# Patient Record
Sex: FEMALE | Race: WHITE | NOT HISPANIC OR LATINO | Employment: OTHER | URBAN - METROPOLITAN AREA
[De-identification: names, ages, dates, MRNs, and addresses within clinical notes are randomized per-mention and may not be internally consistent; named-entity substitution may affect disease eponyms.]

---

## 2017-07-07 ENCOUNTER — TELEPHONE (OUTPATIENT)
Dept: SURGERY | Facility: CLINIC | Age: 67
End: 2017-07-07

## 2017-07-11 ENCOUNTER — TELEPHONE (OUTPATIENT)
Dept: SURGERY | Facility: CLINIC | Age: 67
End: 2017-07-11

## 2017-07-11 NOTE — TELEPHONE ENCOUNTER
----- Message from Liz Glasgow sent at 7/11/2017 11:49 AM CDT -----  Contact: self  Pt states she would like to reschedule apt to 07/19/2017 if possible.Please call Cindy seymour @ [phone].Thank you.

## 2017-07-17 ENCOUNTER — INITIAL CONSULT (OUTPATIENT)
Dept: SURGERY | Facility: CLINIC | Age: 67
End: 2017-07-17
Payer: MEDICARE

## 2017-07-17 ENCOUNTER — TELEPHONE (OUTPATIENT)
Dept: GASTROENTEROLOGY | Facility: CLINIC | Age: 67
End: 2017-07-17

## 2017-07-17 VITALS
TEMPERATURE: 98 F | WEIGHT: 216.5 LBS | DIASTOLIC BLOOD PRESSURE: 83 MMHG | SYSTOLIC BLOOD PRESSURE: 136 MMHG | HEART RATE: 96 BPM

## 2017-07-17 DIAGNOSIS — R93.3 ABNORMAL CT SCAN, GASTROINTESTINAL TRACT: Primary | ICD-10-CM

## 2017-07-17 DIAGNOSIS — D13.6 CYSTADENOMA OF PANCREAS: ICD-10-CM

## 2017-07-17 DIAGNOSIS — K86.89 PANCREATIC MASS: Primary | ICD-10-CM

## 2017-07-17 PROCEDURE — 1126F AMNT PAIN NOTED NONE PRSNT: CPT | Mod: ,,, | Performed by: SURGERY

## 2017-07-17 PROCEDURE — 99999 PR PBB SHADOW E&M-EST. PATIENT-LVL III: CPT | Mod: PBBFAC,,, | Performed by: SURGERY

## 2017-07-17 PROCEDURE — 99213 OFFICE O/P EST LOW 20 MIN: CPT | Mod: PBBFAC | Performed by: SURGERY

## 2017-07-17 PROCEDURE — 99204 OFFICE O/P NEW MOD 45 MIN: CPT | Mod: S$PBB,,, | Performed by: SURGERY

## 2017-07-17 PROCEDURE — 1159F MED LIST DOCD IN RCRD: CPT | Mod: ,,, | Performed by: SURGERY

## 2017-07-17 RX ORDER — ZOLPIDEM TARTRATE 10 MG/1
10 TABLET ORAL NIGHTLY
COMMUNITY
Start: 2017-07-10

## 2017-07-17 RX ORDER — SIMVASTATIN 40 MG/1
40 TABLET, FILM COATED ORAL NIGHTLY
COMMUNITY

## 2017-07-17 RX ORDER — INSULIN ASPART 100 [IU]/ML
INJECTION, SOLUTION INTRAVENOUS; SUBCUTANEOUS
COMMUNITY

## 2017-07-17 RX ORDER — OMEPRAZOLE 20 MG/1
20 CAPSULE, DELAYED RELEASE ORAL
COMMUNITY
Start: 2017-02-16 | End: 2018-02-16

## 2017-07-17 RX ORDER — LISINOPRIL 10 MG/1
10 TABLET ORAL DAILY
COMMUNITY

## 2017-07-17 RX ORDER — ALBUTEROL SULFATE 0.83 MG/ML
2.5 SOLUTION RESPIRATORY (INHALATION) 2 TIMES DAILY PRN
COMMUNITY
Start: 2017-03-20

## 2017-07-17 NOTE — PROGRESS NOTES
"History & Physical    SUBJECTIVE:     History of Present Illness:  Patient is a 66 y.o. 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 She did not know it was present on a scan in 2013 but has had miniscule growth since then. She c/o LLQ pain which is why she got the scan this time, not associated with anything, no constipation/diarrhea, no weight loss. Has insulin pump for DM, not well controlled. She has diverticulosis on her cscope.     Reports hgb a1c 8 in May    Of note, her mother recently went to hospice and her  is undergoing treatment for facial melanoma    Chief Complaint   Patient presents with    Consult       Review of patient's allergies indicates:   Allergen Reactions    Meperidine Other (See Comments)     PT STATES, "I DON'T KNOW WHAT DEMEROL DOES TO ME; IT'S BEEN SO LONG AGO"       Current Outpatient Prescriptions   Medication Sig Dispense Refill    insulin aspart (NOVOLOG) 100 unit/mL injection Inject into the skin.      INSULIN PUMP CARTRIDGE SUBQ Inject into the skin.      lisinopril 10 MG tablet Take 10 mg by mouth.      omeprazole (PRILOSEC) 20 MG capsule Take 20 mg by mouth.      simvastatin (ZOCOR) 40 MG tablet Take 40 mg by mouth.      zolpidem (AMBIEN) 10 mg Tab Take 10 mg by mouth.      albuterol (PROVENTIL) 2.5 mg /3 mL (0.083 %) nebulizer solution Inhale 2.5 mg into the lungs.       No current facility-administered medications for this visit.        Past Medical History:   Diagnosis Date    Diabetes mellitus     Gallstones     HTN (hypertension)     Hyperlipidemia     Pancreas cyst      Past Surgical History:   Procedure Laterality Date    CHOLECYSTECTOMY  2017    TOTAL ABDOMINAL HYSTERECTOMY W/ BILATERAL SALPINGOOPHORECTOMY      Bladder sling/pelvic mesh     History reviewed. No pertinent family history.  Social History   Substance Use Topics    Smoking status: Never Smoker    Smokeless tobacco: Never Used    Alcohol use No        Review " of Systems:  Review of Systems   Constitutional: Negative for activity change, appetite change, chills and fever.   HENT: Negative for congestion and trouble swallowing.    Eyes: Negative for visual disturbance.   Respiratory: Negative for cough and shortness of breath.    Cardiovascular: Negative for chest pain and leg swelling.   Gastrointestinal: Negative for abdominal distention, abdominal pain, constipation, diarrhea, nausea and vomiting.   Endocrine: Negative for cold intolerance and heat intolerance.   Genitourinary: Negative for difficulty urinating and dysuria.   Musculoskeletal: Negative for arthralgias and back pain.   Skin: Negative for rash and wound.   Neurological: Negative for dizziness and headaches.   Psychiatric/Behavioral: Negative for agitation and behavioral problems.       OBJECTIVE:     Vital Signs (Most Recent)  Temp: 97.7 °F (36.5 °C) (07/17/17 1342)  Pulse: 96 (07/17/17 1342)  BP: 136/83 (07/17/17 1342)     98.2 kg (216 lb 7.9 oz)     Physical Exam:  Physical Exam   Constitutional: She is oriented to person, place, and time. She appears well-developed and well-nourished. No distress.   Cardiovascular: Normal rate and regular rhythm.  Exam reveals no gallop and no friction rub.    No murmur heard.  Pulmonary/Chest: Effort normal and breath sounds normal. No respiratory distress. She has no wheezes. She has no rales.   Abdominal: Soft. Bowel sounds are normal.   Musculoskeletal: Normal range of motion. She exhibits no edema.   Neurological: She is alert and oriented to person, place, and time.   Skin: Skin is warm and dry.   Psychiatric: She has a normal mood and affect. Her behavior is normal.       CT scan reviewed 16mm well circumscribed cystic lesion in the tail of the pancreas    ASSESSMENT/PLAN:     65yo woman with cystic pancreas tail mass    PLAN:Plan     EUS to further characterize  Will determine next time after but currently no overtly concerning findings    Ingrid Escalante  PGY4  [phone]    I have personally taken the history and examined this patient and agree with the resident's note as stated above.  CT scan personally reviewed.   I have recommended EUS to characterize the lesion more accurately with further recs to follow. She understands and agrees

## 2017-07-24 ENCOUNTER — TELEPHONE (OUTPATIENT)
Dept: SURGERY | Facility: CLINIC | Age: 67
End: 2017-07-24

## 2017-07-24 ENCOUNTER — TELEPHONE (OUTPATIENT)
Dept: GASTROENTEROLOGY | Facility: CLINIC | Age: 67
End: 2017-07-24

## 2017-07-24 ENCOUNTER — TELEPHONE (OUTPATIENT)
Dept: ENDOSCOPY | Facility: HOSPITAL | Age: 67
End: 2017-07-24

## 2017-07-24 NOTE — TELEPHONE ENCOUNTER
----- Message from Marlin Roldan sent at 7/24/2017 10:33 AM CDT -----  Contact: self   Cindy States that they need a call returned by a nurse in reference to scheduling her surgery asap since she is here today . Please call Cindy @ [phone].                                  Thanks :)

## 2017-07-24 NOTE — TELEPHONE ENCOUNTER
Spoke with patient.  YAYA scheduled 7/31/17 at 1300.  Afternoon time per patient's request.  Instructions emailed.  She will ask her doctor about adjusting insulin pump while fasting.

## 2017-07-31 ENCOUNTER — SURGERY (OUTPATIENT)
Age: 67
End: 2017-07-31

## 2017-07-31 ENCOUNTER — HOSPITAL ENCOUNTER (OUTPATIENT)
Facility: HOSPITAL | Age: 67
Discharge: HOME | End: 2017-07-31
Attending: INTERNAL MEDICINE | Admitting: INTERNAL MEDICINE
Payer: MEDICARE

## 2017-07-31 ENCOUNTER — ANESTHESIA (OUTPATIENT)
Dept: ENDOSCOPY | Facility: HOSPITAL | Age: 67
End: 2017-07-31
Payer: MEDICARE

## 2017-07-31 ENCOUNTER — ANESTHESIA EVENT (OUTPATIENT)
Dept: ENDOSCOPY | Facility: HOSPITAL | Age: 67
End: 2017-07-31
Payer: MEDICARE

## 2017-07-31 VITALS
DIASTOLIC BLOOD PRESSURE: 79 MMHG | OXYGEN SATURATION: 100 % | SYSTOLIC BLOOD PRESSURE: 143 MMHG | BODY MASS INDEX: 34.72 KG/M2 | HEIGHT: 66 IN | HEART RATE: 70 BPM | RESPIRATION RATE: 18 BRPM | WEIGHT: 216 LBS | TEMPERATURE: 98 F

## 2017-07-31 DIAGNOSIS — K86.2 PANCREAS CYST: Primary | ICD-10-CM

## 2017-07-31 LAB
POCT GLUCOSE: 137 MG/DL (ref 70–110)
POCT GLUCOSE: 185 MG/DL (ref 70–110)

## 2017-07-31 PROCEDURE — 43243 EGD INJECTION VARICES: CPT | Performed by: INTERNAL MEDICINE

## 2017-07-31 PROCEDURE — 88305 TISSUE EXAM BY PATHOLOGIST: CPT | Mod: 26,,, | Performed by: PATHOLOGY

## 2017-07-31 PROCEDURE — D9220A PRA ANESTHESIA: Mod: CRNA,,, | Performed by: NURSE ANESTHETIST, CERTIFIED REGISTERED

## 2017-07-31 PROCEDURE — 63600175 PHARM REV CODE 636 W HCPCS: Performed by: NURSE ANESTHETIST, CERTIFIED REGISTERED

## 2017-07-31 PROCEDURE — 82962 GLUCOSE BLOOD TEST: CPT | Performed by: INTERNAL MEDICINE

## 2017-07-31 PROCEDURE — 37000008 HC ANESTHESIA 1ST 15 MINUTES: Performed by: INTERNAL MEDICINE

## 2017-07-31 PROCEDURE — 88313 SPECIAL STAINS GROUP 2: CPT | Mod: 26,,, | Performed by: PATHOLOGY

## 2017-07-31 PROCEDURE — 88172 CYTP DX EVAL FNA 1ST EA SITE: CPT | Mod: 26,,, | Performed by: PATHOLOGY

## 2017-07-31 PROCEDURE — D9220A PRA ANESTHESIA: Mod: ANES,,, | Performed by: ANESTHESIOLOGY

## 2017-07-31 PROCEDURE — 43242 EGD US FINE NEEDLE BX/ASPIR: CPT | Mod: ,,, | Performed by: INTERNAL MEDICINE

## 2017-07-31 PROCEDURE — 88172 CYTP DX EVAL FNA 1ST EA SITE: CPT | Performed by: PATHOLOGY

## 2017-07-31 PROCEDURE — 43242 EGD US FINE NEEDLE BX/ASPIR: CPT | Performed by: INTERNAL MEDICINE

## 2017-07-31 PROCEDURE — 25000003 PHARM REV CODE 250: Performed by: INTERNAL MEDICINE

## 2017-07-31 PROCEDURE — 37000009 HC ANESTHESIA EA ADD 15 MINS: Performed by: INTERNAL MEDICINE

## 2017-07-31 PROCEDURE — 88305 TISSUE EXAM BY PATHOLOGIST: CPT | Performed by: PATHOLOGY

## 2017-07-31 PROCEDURE — 25000003 PHARM REV CODE 250: Performed by: NURSE ANESTHETIST, CERTIFIED REGISTERED

## 2017-07-31 PROCEDURE — 88173 CYTOPATH EVAL FNA REPORT: CPT | Mod: 26,,, | Performed by: PATHOLOGY

## 2017-07-31 RX ORDER — SODIUM CHLORIDE 9 MG/ML
INJECTION, SOLUTION INTRAVENOUS CONTINUOUS PRN
Status: DISCONTINUED | OUTPATIENT
Start: 2017-07-31 | End: 2017-07-31

## 2017-07-31 RX ORDER — GLYCOPYRROLATE 0.2 MG/ML
INJECTION INTRAMUSCULAR; INTRAVENOUS
Status: DISCONTINUED | OUTPATIENT
Start: 2017-07-31 | End: 2017-07-31

## 2017-07-31 RX ORDER — ONDANSETRON 2 MG/ML
INJECTION INTRAMUSCULAR; INTRAVENOUS
Status: DISCONTINUED | OUTPATIENT
Start: 2017-07-31 | End: 2017-07-31

## 2017-07-31 RX ORDER — LIDOCAINE HCL/PF 100 MG/5ML
SYRINGE (ML) INTRAVENOUS
Status: DISCONTINUED | OUTPATIENT
Start: 2017-07-31 | End: 2017-07-31

## 2017-07-31 RX ORDER — PROPOFOL 10 MG/ML
VIAL (ML) INTRAVENOUS CONTINUOUS PRN
Status: DISCONTINUED | OUTPATIENT
Start: 2017-07-31 | End: 2017-07-31

## 2017-07-31 RX ORDER — CIPROFLOXACIN 2 MG/ML
INJECTION, SOLUTION INTRAVENOUS
Status: DISCONTINUED | OUTPATIENT
Start: 2017-07-31 | End: 2017-07-31

## 2017-07-31 RX ORDER — SODIUM CHLORIDE 9 MG/ML
INJECTION, SOLUTION INTRAVENOUS CONTINUOUS
Status: DISCONTINUED | OUTPATIENT
Start: 2017-07-31 | End: 2017-07-31 | Stop reason: HOSPADM

## 2017-07-31 RX ORDER — CIPROFLOXACIN 500 MG/1
500 TABLET ORAL 2 TIMES DAILY
Qty: 6 TABLET | Refills: 0 | Status: SHIPPED | OUTPATIENT
Start: 2017-07-31 | End: 2017-08-03

## 2017-07-31 RX ADMIN — GLYCOPYRROLATE 0.2 MG: 0.2 INJECTION, SOLUTION INTRAMUSCULAR; INTRAVENOUS at 01:07

## 2017-07-31 RX ADMIN — LIDOCAINE HYDROCHLORIDE 100 MG: 20 INJECTION, SOLUTION INTRAVENOUS at 01:07

## 2017-07-31 RX ADMIN — CIPROFLOXACIN 400 MG: 2 INJECTION, SOLUTION INTRAVENOUS at 02:07

## 2017-07-31 RX ADMIN — PROPOFOL 150 MCG/KG/MIN: 10 INJECTION, EMULSION INTRAVENOUS at 01:07

## 2017-07-31 RX ADMIN — SODIUM CHLORIDE: 0.9 INJECTION, SOLUTION INTRAVENOUS at 01:07

## 2017-07-31 RX ADMIN — ONDANSETRON 4 MG: 2 INJECTION INTRAMUSCULAR; INTRAVENOUS at 02:07

## 2017-07-31 NOTE — PATIENT INSTRUCTIONS
Discharge Summary/Instructions for after an Upper EUS  Patient Name: Cindy Jane  Patient MRN: [id number]  Patient YOB: 1950 Monday, July 31, 2017  Abelino Mirza MD  1.  Do Not eat or drink anything for 1 hour.  Try sips of water first.  If   tolerated, resume your regular diet or one recommended by your physician.  2.  Do not drive, operate machinery, make critical decisions, or do   activities that require coordination or balance for 24 hours.  3.  You may experience a sore throat for 24 to 48 hours.  You may use throat   lozenges or gargle with warm salt water to relieve the discomfort.  4.  Because air was put into your stomach during the procedure, you may   experience some belching.  5.  Go directly to the emergency room if you notice any of the following:   Chills and/or fever over 101 F   Persistent vomiting or vomiting with blood   Severe abdominal pain, other than gas cramps   Severe chest pain   Black, tarry stools  Your doctor recommends these additional instructions:  You are being discharged to home.   Resume your previous diet.   Continue your present medications.   Return to your referring physician.   We are waiting for cytology results.  If you have any questions on the above instructions, call the GI Lab and ask   for an endoscopy nurse.  If you have any problems, please call your physician.  EMERGENCY PHONE NUMBER: [phone]  LAB RESULTS: [phone]  Abelino Mirza MD  7/31/2017 2:55:24 PM  This report has been verified and signed electronically.

## 2017-07-31 NOTE — TRANSFER OF CARE
"Anesthesia Transfer of Care Note    Patient: Cindy Jane    Procedure(s) Performed: Procedure(s) (LRB):  ULTRASOUND-ENDOSCOPIC-UPPER (N/A)    Patient location: PACU    Anesthesia Type: general    Transport from OR: Transported from OR on 2-3 L/min O2 by NC with adequate spontaneous ventilation    Post pain: adequate analgesia    Post assessment: no apparent anesthetic complications    Post vital signs: stable    Level of consciousness: awake    Nausea/Vomiting: no nausea/vomiting    Complications: none    Transfer of care protocol was followed      Last vitals:   Visit Vitals  BP (!) 141/72 (BP Location: Left arm, Patient Position: Lying, BP Method: Automatic)   Pulse 83   Temp 36.6 °C (97.9 °F) (Temporal)   Resp 18   Ht 5' 6" (1.676 m)   Wt 98 kg (216 lb)   SpO2 99%   Breastfeeding? No   BMI 34.86 kg/m²     "

## 2017-07-31 NOTE — ANESTHESIA PREPROCEDURE EVALUATION
07/31/2017  Cindy Jane is a 66 y.o., female.    Anesthesia Evaluation         Review of Systems  Anesthesia Hx:  No problems with previous Anesthesia   Social:  Non-Smoker, No Alcohol Use    Cardiovascular:   Hypertension, well controlled 2 METS   Pulmonary:  Pulmonary Normal Heavy snoar   Hepatic/GI:   Some nausea after large meals   Neurological:  Neurology Normal    Endocrine:   Diabetes, using insulin        Physical Exam  General:  Morbid Obesity    Airway/Jaw/Neck:  Airway Findings: Mouth Opening: Normal Tongue: Normal  General Airway Assessment: Adult  Mallampati: II  Improves to II with phonation.  TM Distance: Normal, at least 6 cm  Jaw/Neck Findings:  Neck ROM: Normal ROM      Dental:  Dental Findings: In tact   Chest/Lungs:  Chest/Lungs Findings: Clear to auscultation         Mental Status:  Mental Status Findings:  Cooperative         Anesthesia Plan  Type of Anesthesia, risks & benefits discussed:  Anesthesia Type:  general  Patient's Preference:   Intra-op Monitoring Plan: standard ASA monitors  Intra-op Monitoring Plan Comments:   Post Op Pain Control Plan: multimodal analgesia  Post Op Pain Control Plan Comments:   Induction:   IV  Beta Blocker:  Patient is not currently on a Beta-Blocker (No further documentation required).       Informed Consent: Patient understands risks and agrees with Anesthesia plan.  Questions answered. Anesthesia consent signed with patient.  ASA Score: 2     Day of Surgery Review of History & Physical:    H&P update referred to the surgeon.         Ready For Surgery From Anesthesia Perspective.

## 2017-07-31 NOTE — ANESTHESIA POSTPROCEDURE EVALUATION
"Anesthesia Post Evaluation    Patient: Cindy Jane    Procedure(s) Performed: Procedure(s) (LRB):  ULTRASOUND-ENDOSCOPIC-UPPER (N/A)    Final Anesthesia Type: general  Patient location during evaluation: PACU  Patient participation: Yes- Able to Participate  Level of consciousness: awake and alert  Post-procedure vital signs: reviewed and stable  Pain management: adequate  Airway patency: patent  PONV status at discharge: No PONV  Anesthetic complications: no      Cardiovascular status: blood pressure returned to baseline  Respiratory status: unassisted  Hydration status: euvolemic  Follow-up not needed.        Visit Vitals  BP (!) 148/70   Pulse 70   Temp 36.6 °C (97.9 °F) (Temporal)   Resp 18   Ht 5' 6" (1.676 m)   Wt 98 kg (216 lb)   SpO2 100%   Breastfeeding? No   BMI 34.86 kg/m²       Pain/Eugene Score: Pain Assessment Performed: Yes (7/31/2017  2:35 PM)  Presence of Pain: non-verbal indicators absent (7/31/2017  2:35 PM)  Eugene Score: 5 (7/31/2017  2:35 PM)      "

## 2017-07-31 NOTE — DISCHARGE INSTRUCTIONS
Endoscopic Ultrasound (EUS)    An endoscopic ultrasound (EUS) is a test to look at the inside of your gastrointestinal (GI) tract. It's commonly used to look for cancers or growths in the esophagus, stomach, pancreas, liver, and rectum. It can help to stage cancer (see how advanced a cancer is). EUS may also be used to help diagnose certain diseases or to drain cysts or abscesses.  What is EUS?  EUS shows both ultrasound images and live video of the GI tract. During the test, a flexible tube called an endoscope (scope) is used. At the end of the scope is a tiny video camera and light. The video camera sends live images to a monitor. The scope also contains a very small ultrasound device. This uses sound waves to create images and send them to a monitor.  A needle is passed through the scope. The needle can be used take a small sample of tissue for testing. This is called a biopsy. The needle can be used to take a sample of fluid. This is called fine-needle aspiration (FNA).  Risks and possible complications of EUS  Risks and possible complications include the following:  · Bleeding  · Infection  · A perforation (hole) in the digestive tract   · Risks of sedation or anesthesia   Before the test  Be prepared prior to the test:  · Tell your healthcare provider what medicine you take. This includes vitamins, herbs, and over-the-counter medicine. It also includes any blood thinners, such as warfarin, clopidogrel, ibuprofen, or daily aspirin. Ask your healthcare provider if you need to stop taking some or all of them before the test.  · You may be prescribed antibiotics to take before or after the test. This depends on the area being studied and what is done during the test. These medicines help prevent infection.  · Carefully follow the instructions for preparing for the test to make sure results are accurate. Instructions may include:  ¨ If youre having an EUS of the upper GI tract (esophagus, stomach, duodenum,  pancreas, liver):  § Do not eat or drink for 6 hours before the test.  ¨ If youre having an EUS of the lower GI tract (rectum):  § Before the test, do bowel prep as instructed to clean your rectum of stool. This may involve a clear liquid diet and using a laxative (liquid or pills) the night before the test. Or it may mean doing one or more enemas the morning of the test.  § Do not eat or drink for 6 hours before the test.  · Be sure to arrive on time at the facility. Bring your identification and health insurance card. Leave valuables at home. If you have them, bring X-rays or other test results with you.  Let the healthcare provider know  For your safety, tell the healthcare provider if you:  · Take insulin. Your dose may need to be changed on the day of your test.  · Are allergic to latex.  · Have any other allergies.  · Are taking blood thinners.   During the test  An endoscopic ultrasound usually takes place in a hospital. The procedure itself may take 1 to 2 hours. You will likely go home soon afterward. During the test:  · You lie on your left side on an exam table.  · An intravenous (IV) line will be put into a vein in your arm or hand. This line supplies fluids and medicines. To keep you comfortable during the test, you will be given a sedative medicine. This medicine prevents discomfort and will make you sleepy.  · If you are having an EUS of the upper GI tract, local anesthetic may be sprayed in your throat. This will help you be more comfortable as the healthcare provider inserts the scope. The healthcare provider then gently puts the flexible scope into your mouth or nose and down your throat.  · If youre having an EUS of the lower GI tract, the healthcare provider gently puts the flexible scope into your anus.  · During the test, the scope sends live video and ultrasound images from inside your body to nearby monitors. These are used to examine your GI tract. Specialized procedures, such as drainage,  are done as needed.  · The healthcare provider may discuss the results with you soon after the test. Biopsy results take several  days.  · In most cases, you can go home within a few hours of the test. When you leave the facility, have an adult family member or friend drive you, even if you don't feel that sleepy.  After the test  Here is what to expect after the test:  · You may feel tired from the sedative. This should wear off by the end of the day.  · If you had an upper digestive endoscopy, your throat may feel sore for a day or two. Over-the-counter sore throat lozenges and spray should help.  · You can eat and drink normally as soon as the test is done.  When to call the healthcare provider  Call your healthcare provider if you notice any of the following:  · Fever of 100.4°F (38.0°C) or higher, or as advised by your healthcare provider  · Shortness of breath  · Vomiting blood, blood in stool, or black stools  · Coughing or hoarse voice that wont go away   Date Last Reviewed: 7/1/2016  © 1486-4304 [employer]. [address] [place] [address]. All rights reserved. This information is not intended as a substitute for professional medical care. Always follow your healthcare professional's instructions.

## 2017-07-31 NOTE — ANESTHESIA RELEASE NOTE
"Anesthesia Release from PACU Note    Patient: Cindy Jane    Procedure(s) Performed: Procedure(s) (LRB):  ULTRASOUND-ENDOSCOPIC-UPPER (N/A)    Anesthesia type: general    Post pain: Adequate analgesia    Post assessment: no apparent anesthetic complications    Last Vitals:   Visit Vitals  BP (!) 148/70   Pulse 70   Temp 36.6 °C (97.9 °F) (Temporal)   Resp 18   Ht 5' 6" (1.676 m)   Wt 98 kg (216 lb)   SpO2 100%   Breastfeeding? No   BMI 34.86 kg/m²       Post vital signs: stable    Level of consciousness: awake, alert  and oriented    Nausea/Vomiting: no nausea/no vomiting    Complications: none    Airway Patency: patent    Respiratory: unassisted    Cardiovascular: stable and blood pressure at baseline    Hydration: euvolemic  "

## 2017-07-31 NOTE — H&P
"History & Physical - Short Stay  Gastroenterology      SUBJECTIVE:     Procedure: EUS    Chief Complaint/Indication for Procedure: pancreas cyst    History of Present Illness:  Patient is a 66 y.o. female with incidental pancreas cyst coming for EUS.     PTA Medications   Medication Sig    albuterol (PROVENTIL) 2.5 mg /3 mL (0.083 %) nebulizer solution Inhale 2.5 mg into the lungs.    insulin aspart (NOVOLOG) 100 unit/mL injection Inject into the skin.    INSULIN PUMP CARTRIDGE SUBQ Inject into the skin.    lisinopril 10 MG tablet Take 10 mg by mouth.    omeprazole (PRILOSEC) 20 MG capsule Take 20 mg by mouth.    simvastatin (ZOCOR) 40 MG tablet Take 40 mg by mouth.    zolpidem (AMBIEN) 10 mg Tab Take 10 mg by mouth.       Review of patient's allergies indicates:   Allergen Reactions    Meperidine Other (See Comments)     PT STATES, "I DON'T KNOW WHAT DEMEROL DOES TO ME; IT'S BEEN SO LONG AGO"        Past Medical History:   Diagnosis Date    Diabetes mellitus     Gallstones     HTN (hypertension)     Hyperlipidemia     Pancreas cyst      Past Surgical History:   Procedure Laterality Date    CHOLECYSTECTOMY  2017    TOTAL ABDOMINAL HYSTERECTOMY W/ BILATERAL SALPINGOOPHORECTOMY      Bladder sling/pelvic mesh     History reviewed. No pertinent family history.  Social History   Substance Use Topics    Smoking status: Never Smoker    Smokeless tobacco: Never Used    Alcohol use No       Review of Systems:  Constitutional: no fever or chills  Gastrointestinal: no nausea or vomiting, no abdominal pain or change in bowel habits    OBJECTIVE:     Vital Signs (Most Recent)       Physical Exam:  General: well developed, well nourished  Abdomen: soft, non-tender non-distented; bowel sounds normal; no masses,  no organomegaly         ASSESSMENT/PLAN:     Patient is a 66 y.o. female with incidental pancreas cyst coming for EUS.     Plan: EUS    Anesthesia Plan: MAC    ASA Grade: ASA 2 - Patient with mild " systemic disease with no functional limitations

## 2017-07-31 NOTE — PLAN OF CARE
Patient is ready for discharge and is waiting on Dr Fox to come to patients bedside. Endoscopy unit notified.

## 2017-07-31 NOTE — PLAN OF CARE
Plan of care discussed with pt and her . Understanding verbalized. Pt states can discuss results with her

## 2017-08-08 ENCOUNTER — TELEPHONE (OUTPATIENT)
Dept: SURGERY | Facility: CLINIC | Age: 67
End: 2017-08-08

## 2017-08-08 NOTE — TELEPHONE ENCOUNTER
----- Message from Marlin Roldan sent at 8/8/2017  8:33 AM CDT -----  Contact: self  Cindy Called and wanted to schedule a appointment with  for august 16th           And also had general questions. Please call Cindy @ [phone]  . Thanks :)

## 2017-08-09 ENCOUNTER — TELEPHONE (OUTPATIENT)
Dept: SURGERY | Facility: CLINIC | Age: 67
End: 2017-08-09

## 2017-08-09 ENCOUNTER — TELEPHONE (OUTPATIENT)
Dept: ENDOSCOPY | Facility: HOSPITAL | Age: 67
End: 2017-08-09

## 2017-08-09 NOTE — TELEPHONE ENCOUNTER
Patricia Bone, patient called asking about Bx results from case done 7/31/17.  She can be reached at [phone].

## 2017-08-11 ENCOUNTER — TELEPHONE (OUTPATIENT)
Dept: SURGERY | Facility: CLINIC | Age: 67
End: 2017-08-11

## 2018-07-03 ENCOUNTER — TELEPHONE (OUTPATIENT)
Dept: SURGERY | Facility: CLINIC | Age: 68
End: 2018-07-03

## 2018-07-06 ENCOUNTER — TELEPHONE (OUTPATIENT)
Dept: SURGERY | Facility: CLINIC | Age: 68
End: 2018-07-06

## 2018-08-06 ENCOUNTER — TELEPHONE (OUTPATIENT)
Dept: SURGERY | Facility: CLINIC | Age: 68
End: 2018-08-06

## 2019-07-16 ENCOUNTER — TELEPHONE (OUTPATIENT)
Dept: SURGERY | Facility: CLINIC | Age: 69
End: 2019-07-16

## 2019-07-16 NOTE — TELEPHONE ENCOUNTER
----- Message from Justine Georges sent at 7/16/2019 11:44 AM CDT -----  Contact: pt at [phone]  Needs Advice    Reason for call:  Returning a call from a little while ago.        Communication Preference:call at above number.  Ref to an appt and possible testing that she may need.

## 2020-06-23 DIAGNOSIS — K86.89 PANCREATIC MASS: Primary | ICD-10-CM

## 2020-06-23 DIAGNOSIS — R93.3 ABNORMAL FINDINGS ON DIAGNOSTIC IMAGING OF OTHER PARTS OF DIGESTIVE TRACT: ICD-10-CM

## 2020-07-14 ENCOUNTER — TELEPHONE (OUTPATIENT)
Dept: SURGERY | Facility: CLINIC | Age: 70
End: 2020-07-14

## 2020-07-15 ENCOUNTER — HOSPITAL ENCOUNTER (OUTPATIENT)
Dept: RADIOLOGY | Facility: HOSPITAL | Age: 70
Discharge: HOME | End: 2020-07-15
Attending: SURGERY
Payer: MEDICARE

## 2020-07-15 ENCOUNTER — OFFICE VISIT (OUTPATIENT)
Dept: SURGICAL ONCOLOGY | Facility: CLINIC | Age: 70
End: 2020-07-15
Payer: MEDICARE

## 2020-07-15 VITALS
RESPIRATION RATE: 18 BRPM | TEMPERATURE: 98 F | DIASTOLIC BLOOD PRESSURE: 74 MMHG | HEIGHT: 63 IN | WEIGHT: 197.31 LBS | BODY MASS INDEX: 34.96 KG/M2 | HEART RATE: 81 BPM | SYSTOLIC BLOOD PRESSURE: 117 MMHG

## 2020-07-15 DIAGNOSIS — D13.6 CYSTADENOMA OF PANCREAS: Primary | ICD-10-CM

## 2020-07-15 DIAGNOSIS — R93.3 ABNORMAL FINDINGS ON DIAGNOSTIC IMAGING OF OTHER PARTS OF DIGESTIVE TRACT: ICD-10-CM

## 2020-07-15 DIAGNOSIS — Z04.9 OBSERVATION FOR SUSPECTED CONDITION: ICD-10-CM

## 2020-07-15 DIAGNOSIS — K86.89 PANCREATIC MASS: ICD-10-CM

## 2020-07-15 PROCEDURE — 74183 MRI ABDOMEN WITH AND WO_INC MRCP: ICD-10-PCS | Mod: 26,,, | Performed by: RADIOLOGY

## 2020-07-15 PROCEDURE — 76376 MRI ABDOMEN WITH AND WO_INC MRCP: ICD-10-PCS | Mod: 26,,, | Performed by: RADIOLOGY

## 2020-07-15 PROCEDURE — 99214 OFFICE O/P EST MOD 30 MIN: CPT | Mod: PBBFAC,25,PN | Performed by: SURGERY

## 2020-07-15 PROCEDURE — 99202 OFFICE O/P NEW SF 15 MIN: CPT | Mod: S$PBB,,, | Performed by: SURGERY

## 2020-07-15 PROCEDURE — A9585 GADOBUTROL INJECTION: HCPCS | Mod: PO | Performed by: SURGERY

## 2020-07-15 PROCEDURE — 71046 X-RAY EXAM CHEST 2 VIEWS: CPT | Mod: 26,,, | Performed by: RADIOLOGY

## 2020-07-15 PROCEDURE — 71046 X-RAY EXAM CHEST 2 VIEWS: CPT | Mod: TC,FY,PO

## 2020-07-15 PROCEDURE — 76376 3D RENDER W/INTRP POSTPROCES: CPT | Mod: 26,,, | Performed by: RADIOLOGY

## 2020-07-15 PROCEDURE — 76376 3D RENDER W/INTRP POSTPROCES: CPT | Mod: TC,PO

## 2020-07-15 PROCEDURE — 25500020 PHARM REV CODE 255: Mod: PO | Performed by: SURGERY

## 2020-07-15 PROCEDURE — 71046 XR CHEST PA AND LATERAL: ICD-10-PCS | Mod: 26,,, | Performed by: RADIOLOGY

## 2020-07-15 PROCEDURE — 99999 PR PBB SHADOW E&M-EST. PATIENT-LVL IV: CPT | Mod: PBBFAC,,, | Performed by: SURGERY

## 2020-07-15 PROCEDURE — 99999 PR PBB SHADOW E&M-EST. PATIENT-LVL IV: ICD-10-PCS | Mod: PBBFAC,,, | Performed by: SURGERY

## 2020-07-15 PROCEDURE — 99202 PR OFFICE/OUTPT VISIT, NEW, LEVL II, 15-29 MIN: ICD-10-PCS | Mod: S$PBB,,, | Performed by: SURGERY

## 2020-07-15 PROCEDURE — 74183 MRI ABD W/O CNTR FLWD CNTR: CPT | Mod: 26,,, | Performed by: RADIOLOGY

## 2020-07-15 RX ORDER — ALBUTEROL SULFATE 90 UG/1
2 AEROSOL, METERED RESPIRATORY (INHALATION) EVERY 6 HOURS PRN
COMMUNITY
Start: 2017-02-02

## 2020-07-15 RX ORDER — INSULIN LISPRO 100 [IU]/ML
INJECTION, SOLUTION INTRAVENOUS; SUBCUTANEOUS
COMMUNITY
Start: 2020-05-28

## 2020-07-15 RX ORDER — LANCETS
EACH MISCELLANEOUS
COMMUNITY
Start: 2020-05-10

## 2020-07-15 RX ORDER — LANOLIN ALCOHOL/MO/W.PET/CERES
400 CREAM (GRAM) TOPICAL DAILY
COMMUNITY
Start: 2019-03-14

## 2020-07-15 RX ORDER — DULOXETIN HYDROCHLORIDE 30 MG/1
30 CAPSULE, DELAYED RELEASE ORAL DAILY
COMMUNITY
Start: 2020-05-12

## 2020-07-15 RX ORDER — HYDROGEN PEROXIDE 3 %
20 SOLUTION, NON-ORAL MISCELLANEOUS DAILY
COMMUNITY

## 2020-07-15 RX ORDER — GADOBUTROL 604.72 MG/ML
9 INJECTION INTRAVENOUS
Status: COMPLETED | OUTPATIENT
Start: 2020-07-15 | End: 2020-07-15

## 2020-07-15 RX ORDER — LIDOCAINE 50 MG/G
OINTMENT TOPICAL
COMMUNITY
Start: 2018-08-10

## 2020-07-15 RX ORDER — NEBIVOLOL 5 MG/1
5 TABLET ORAL DAILY
COMMUNITY

## 2020-07-15 RX ORDER — INSULIN LISPRO 100 [IU]/ML
INJECTION, SOLUTION INTRAVENOUS; SUBCUTANEOUS
COMMUNITY

## 2020-07-15 RX ADMIN — GADOBUTROL 9 ML: 604.72 INJECTION INTRAVENOUS at 09:07

## 2020-07-15 NOTE — PROGRESS NOTES
68 yo lady previously seen by me in July 2017 for a incidentally discovered cystic mass in the tail of the pancreas which measured approx 1.6 cm at that time. 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 Here today for f/u  No interval medical problems. She does have mild left lower abdominal and hip pain which sounds musculoskeletal to me    Vitals:    07/15/20 1010   BP: 117/74   Pulse: 81   Resp: 18   Temp: 98 °F (36.7 °C)     Wt Readings from Last 3 Encounters:   07/15/20 89.5 kg (197 lb 5 oz)   07/31/17 98 kg (216 lb)   07/17/17 98.2 kg (216 lb 7.9 oz)     WD WN lady in NAD  Abd: obese, soft, with no mass or tenderness.       MRI/MRCP done this am personally reviewed and on my review the lesion now measures 2.6 cm which represents modest growth. Report not yet available    Imp: slowly growing SCA of the tail of the pancreas.     Rec:  Continue f/u with repeat MRI/MRCP in two years  She understands

## 2021-12-29 ENCOUNTER — TELEPHONE (OUTPATIENT)
Dept: SURGERY | Facility: CLINIC | Age: 71
End: 2021-12-29
Payer: MEDICARE

## 2021-12-29 DIAGNOSIS — D13.6 CYSTADENOMA OF PANCREAS: Primary | ICD-10-CM

## 2022-01-18 DIAGNOSIS — D13.6 CYSTADENOMA OF PANCREAS: Primary | ICD-10-CM

## 2022-01-19 ENCOUNTER — TELEPHONE (OUTPATIENT)
Dept: SURGERY | Facility: CLINIC | Age: 72
End: 2022-01-19
Payer: MEDICARE

## 2022-01-19 NOTE — TELEPHONE ENCOUNTER
----- Message from Cinda Alexander RN sent at 1/19/2022 11:12 AM CST -----  Regarding: FW: PT appt  Can you pls call this to r/s her MRI  ----- Message -----  From: Jie Goode  Sent: 1/19/2022  10:48 AM CST  To: [facility] Staff  Subject: PT appt                                          Pt needing to reschedule MRI to the 02/02/2022 due to COVID     [phone] (home)

## 2022-02-02 ENCOUNTER — OFFICE VISIT (OUTPATIENT)
Dept: HEMATOLOGY/ONCOLOGY | Facility: CLINIC | Age: 72
End: 2022-02-02
Payer: MEDICARE

## 2022-02-02 ENCOUNTER — HOSPITAL ENCOUNTER (OUTPATIENT)
Dept: RADIOLOGY | Facility: HOSPITAL | Age: 72
Discharge: HOME | End: 2022-02-02
Attending: SURGERY
Payer: MEDICARE

## 2022-02-02 VITALS
WEIGHT: 198.44 LBS | DIASTOLIC BLOOD PRESSURE: 83 MMHG | TEMPERATURE: 97 F | SYSTOLIC BLOOD PRESSURE: 147 MMHG | HEIGHT: 65 IN | HEART RATE: 83 BPM | BODY MASS INDEX: 33.06 KG/M2

## 2022-02-02 DIAGNOSIS — D13.6 CYSTADENOMA OF PANCREAS: ICD-10-CM

## 2022-02-02 DIAGNOSIS — D13.6 CYSTADENOMA OF PANCREAS: Primary | ICD-10-CM

## 2022-02-02 PROCEDURE — 99213 PR OFFICE/OUTPT VISIT, EST, LEVL III, 20-29 MIN: ICD-10-PCS | Mod: S$PBB,,, | Performed by: SURGERY

## 2022-02-02 PROCEDURE — 74183 MRI ABD W/O CNTR FLWD CNTR: CPT | Mod: TC,PO

## 2022-02-02 PROCEDURE — 76376 3D RENDER W/INTRP POSTPROCES: CPT | Mod: 26,,, | Performed by: RADIOLOGY

## 2022-02-02 PROCEDURE — 25500020 PHARM REV CODE 255: Mod: PO | Performed by: SURGERY

## 2022-02-02 PROCEDURE — 74183 MRI ABD W/O CNTR FLWD CNTR: CPT | Mod: 26,,, | Performed by: RADIOLOGY

## 2022-02-02 PROCEDURE — 99999 PR PBB SHADOW E&M-EST. PATIENT-LVL III: CPT | Mod: PBBFAC,,, | Performed by: SURGERY

## 2022-02-02 PROCEDURE — 99213 OFFICE O/P EST LOW 20 MIN: CPT | Mod: S$PBB,,, | Performed by: SURGERY

## 2022-02-02 PROCEDURE — 99213 OFFICE O/P EST LOW 20 MIN: CPT | Mod: PBBFAC,25,PN | Performed by: SURGERY

## 2022-02-02 PROCEDURE — A9585 GADOBUTROL INJECTION: HCPCS | Mod: PO | Performed by: SURGERY

## 2022-02-02 PROCEDURE — 99999 PR PBB SHADOW E&M-EST. PATIENT-LVL III: ICD-10-PCS | Mod: PBBFAC,,, | Performed by: SURGERY

## 2022-02-02 PROCEDURE — 76376 MRI ABDOMEN WITH AND WO_INC MRCP: ICD-10-PCS | Mod: 26,,, | Performed by: RADIOLOGY

## 2022-02-02 PROCEDURE — 74183 MRI ABDOMEN WITH AND WO_INC MRCP: ICD-10-PCS | Mod: 26,,, | Performed by: RADIOLOGY

## 2022-02-02 RX ORDER — GADOBUTROL 604.72 MG/ML
10 INJECTION INTRAVENOUS
Status: COMPLETED | OUTPATIENT
Start: 2022-02-02 | End: 2022-02-02

## 2022-02-02 RX ADMIN — GADOBUTROL 10 ML: 604.72 INJECTION INTRAVENOUS at 11:02

## 2022-02-02 NOTE — PROGRESS NOTES
First seen by me in 2017 for an incidentally discovered, asymptomatic cyst lesion in the tail of the pancreas which was characterized by EUS as a serous cystadenoma. Followed q 2years since then and has shown slow enlargement.   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 She was told by her surgeon that the pancreas mass was bigger and that she should have it checked out again. She continues to have no symptoms referable to the mass.     MRI/MRCP done today and I have personally reviewed it (report not available). The SCA of the tail of the pancreas now measures about 39 mm.    Imp:  Slowly enlarging SCA of the tail of the pancreas    Rec:  Continue to watch and wait; repeat MRI/MRCP in two years    Discussed with Mrs COLLINS; she understands and agrees

## 2024-10-08 ENCOUNTER — TELEPHONE (OUTPATIENT)
Dept: SURGERY | Facility: CLINIC | Age: 74
End: 2024-10-08
Payer: MEDICARE

## 2024-10-22 NOTE — PROGRESS NOTES
Cindy is a 74yo F who has been seen by KRYSTLE several times for a cystic pancreatic tail mass found incidentally on scan in 2013. She was seen by him in 2017 and at that time underwent EUS:    Impression:           - The celiac trunk was endosonographically normal.                         - A multi-cystic lesion was seen in the pancreatic                         tail. However, the endosonographic appearance is                         highly suspicious for a serous cystadenoma. Fine                         needle biopsy performed.                         - There was no sign of significant pathology in the                         common bile duct.                         - There was no sign of significant pathology in the                         ampulla.           CT done in 2017 confirmed a cystic lesion consistent with SCA with small amount of growth:      It was followed in subsequent years by varying modalities and has very slowly increased in size, most recently 9/2024 and it is a little over 4cm.              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 She has been discussed in [place] with concern for an IPMN. I am unclear what information they had available when they made that distinction. The only worrisome feature here is that her CA 19-9 is mildly elevated. It has fluctuated from 85 to 55 to 95 most recently. I don't know if there is another reason this lab is being followed here other than this pancreatic mass.    We discussed the benign nature of SCA, and the indications for resection which really revolve strictly around symptoms and not growth or progression. In this location I do not suspect this will ever require excision, it would need to grow substantially to expect any resultant symptoms. We discussed potential symptoms, likely nausea/early satiety or, in rare cases, splenic vein thrombosis and sinestral hypertension.    This had very typical  appearance of SCA on EUS in 2017 and a benign biopsy. I do not think it unreasonable, if there is continued diagnostic uncertainty, to repeat her EUS and attempt to sample fluid/biopsy for a mucinous process. I'd like to discuss with Dr. Moctezuma.    I spent 60 minutes with Ms. Jane, with >50% of time spent face to face and additional time preparing to see the patient (eg, review of tests), obtaining and/or reviewing separately obtained history, documenting clinical information in the electronic or other health record, independently interpreting results (not separately reported) and communicating results to the patient/family/caregiver, or Care coordination (not separately reported).       Nemesio Jones MD  Upper GI / Hepatobiliary Surgical Oncology  [facility] [place], [place]  Office: [phone]  Fax: [phone]

## 2024-10-23 ENCOUNTER — OFFICE VISIT (OUTPATIENT)
Dept: SURGERY | Facility: CLINIC | Age: 74
End: 2024-10-23
Payer: MEDICARE

## 2024-10-23 VITALS
WEIGHT: 197.06 LBS | HEART RATE: 66 BPM | SYSTOLIC BLOOD PRESSURE: 112 MMHG | BODY MASS INDEX: 32.8 KG/M2 | OXYGEN SATURATION: 98 % | DIASTOLIC BLOOD PRESSURE: 56 MMHG

## 2024-10-23 DIAGNOSIS — D13.6 CYSTADENOMA OF PANCREAS: Primary | ICD-10-CM

## 2024-10-23 PROCEDURE — 99213 OFFICE O/P EST LOW 20 MIN: CPT | Mod: PBBFAC | Performed by: SURGERY

## 2024-10-23 PROCEDURE — 99999 PR PBB SHADOW E&M-EST. PATIENT-LVL III: CPT | Mod: PBBFAC,,, | Performed by: SURGERY

## 2024-10-23 PROCEDURE — 99205 OFFICE O/P NEW HI 60 MIN: CPT | Mod: S$PBB,,, | Performed by: SURGERY

## 2024-10-23 RX ORDER — MELOXICAM 15 MG/1
15 TABLET ORAL DAILY
COMMUNITY
Start: 2024-10-10

## 2024-10-23 RX ORDER — MELOXICAM 7.5 MG/5ML
SUSPENSION ORAL
COMMUNITY
Start: 2024-02-22

## 2024-10-23 RX ORDER — CYCLOBENZAPRINE HYDROCHLORIDE 7.5 MG/1
10 TABLET, FILM COATED ORAL
COMMUNITY
Start: 2023-07-25

## 2024-10-23 RX ORDER — ATORVASTATIN CALCIUM 20 MG/1
5 TABLET, FILM COATED ORAL DAILY
COMMUNITY

## 2024-10-23 RX ORDER — ROSUVASTATIN CALCIUM 5 MG/1
5 TABLET, COATED ORAL DAILY
COMMUNITY
Start: 2024-10-03

## 2024-10-23 RX ORDER — EMPAGLIFLOZIN 25 MG/1
25 TABLET, FILM COATED ORAL DAILY
COMMUNITY
Start: 2024-10-22

## 2024-10-23 RX ORDER — INSULIN LISPRO 100 [IU]/ML
INJECTION, SOLUTION SUBCUTANEOUS
COMMUNITY
Start: 2021-06-24

## 2024-10-24 NOTE — PROGRESS NOTES
Met with pt. Introduction and contact information provided. Instructed to contact the office for any questions or concerns. Pt verbalized understanding.